# Patient Record
Sex: FEMALE | Race: WHITE | Employment: PART TIME | URBAN - METROPOLITAN AREA
[De-identification: names, ages, dates, MRNs, and addresses within clinical notes are randomized per-mention and may not be internally consistent; named-entity substitution may affect disease eponyms.]

---

## 2017-01-25 ENCOUNTER — HOSPITAL ENCOUNTER (OUTPATIENT)
Dept: LAB | Age: 57
Discharge: HOME | End: 2017-01-25
Payer: COMMERCIAL

## 2017-01-25 PROCEDURE — 88305 TISSUE EXAM BY PATHOLOGIST: CPT | Performed by: PLASTIC SURGERY

## 2017-01-31 ENCOUNTER — HOSPITAL ENCOUNTER (OUTPATIENT)
Dept: LAB | Age: 57
Discharge: HOME | End: 2017-01-31
Payer: COMMERCIAL

## 2017-01-31 DIAGNOSIS — I10 ESSENTIAL HYPERTENSION, MALIGNANT: ICD-10-CM

## 2017-01-31 LAB
ANION GAP BLD CALC-SCNC: 7 MMOL/L (ref 3–18)
BUN SERPL-MCNC: 16 MG/DL (ref 7–18)
BUN/CREAT SERPL: 18 (ref 12–20)
CALCIUM SERPL-MCNC: 9 MG/DL (ref 8.5–10.1)
CHLORIDE SERPL-SCNC: 102 MMOL/L (ref 100–108)
CO2 SERPL-SCNC: 29 MMOL/L (ref 21–32)
CREAT SERPL-MCNC: 0.91 MG/DL (ref 0.6–1.3)
GLUCOSE SERPL-MCNC: 320 MG/DL (ref 74–99)
POTASSIUM SERPL-SCNC: 4.5 MMOL/L (ref 3.5–5.5)
SODIUM SERPL-SCNC: 138 MMOL/L (ref 136–145)

## 2017-01-31 PROCEDURE — 93005 ELECTROCARDIOGRAM TRACING: CPT

## 2017-02-01 LAB
ATRIAL RATE: 73 BPM
CALCULATED P AXIS, ECG09: 39 DEGREES
CALCULATED R AXIS, ECG10: -31 DEGREES
CALCULATED T AXIS, ECG11: 3 DEGREES
DIAGNOSIS, 93000: NORMAL
P-R INTERVAL, ECG05: 190 MS
Q-T INTERVAL, ECG07: 414 MS
QRS DURATION, ECG06: 90 MS
QTC CALCULATION (BEZET), ECG08: 456 MS
VENTRICULAR RATE, ECG03: 73 BPM

## 2017-02-07 ENCOUNTER — HOSPITAL ENCOUNTER (OUTPATIENT)
Dept: LAB | Age: 57
Discharge: HOME | End: 2017-02-07
Payer: COMMERCIAL

## 2017-02-07 PROCEDURE — 88305 TISSUE EXAM BY PATHOLOGIST: CPT | Performed by: PLASTIC SURGERY

## 2017-02-07 PROCEDURE — 88331 PATH CONSLTJ SURG 1 BLK 1SPC: CPT | Performed by: PLASTIC SURGERY

## 2017-03-27 ENCOUNTER — OFFICE VISIT (OUTPATIENT)
Dept: SURGERY | Age: 57
End: 2017-03-27

## 2017-03-27 VITALS
SYSTOLIC BLOOD PRESSURE: 120 MMHG | BODY MASS INDEX: 40.12 KG/M2 | TEMPERATURE: 98.2 F | RESPIRATION RATE: 18 BRPM | DIASTOLIC BLOOD PRESSURE: 78 MMHG | HEIGHT: 64 IN | OXYGEN SATURATION: 96 % | WEIGHT: 235 LBS | HEART RATE: 72 BPM

## 2017-03-27 DIAGNOSIS — M79.621 AXILLARY PAIN, RIGHT: Primary | ICD-10-CM

## 2017-03-27 RX ORDER — SPIRONOLACTONE 25 MG/1
TABLET ORAL
Refills: 0 | COMMUNITY
Start: 2017-03-12

## 2017-03-27 RX ORDER — SULFAMETHOXAZOLE AND TRIMETHOPRIM 800; 160 MG/1; MG/1
TABLET ORAL
COMMUNITY
Start: 2017-03-23

## 2017-03-27 NOTE — LETTER
3/27/2017 2:35 PM 
 
Patient:  Amira Aquino YOB: 1960 Date of Visit: 3/27/2017 Miryam Sims MD 
[address] [address] VIA Facsimile: [phone] Dear Miryam Sims MD, Thank you for referring Ms. Sydney Zambrano to [place] [address] for evaluation and treatment. Below are the relevant portions of my assessment and plan of care. Thank you very much for your referral of Ms. Sydney Zambrano. If you have questions, please do not hesitate to call me. I look forward to following Ms. Heather Davidson along with you and will keep you updated as to her progress. Sincerely, Masoud Varela MD

## 2017-03-27 NOTE — PROGRESS NOTES
Patient is a 60 y.o. Female who presents for an evaluation of a recurrent left axillary abscess. She is currently taking Bactrim-DS which was prescribed by her PCP.

## 2017-03-27 NOTE — PATIENT INSTRUCTIONS
If you have any questions or concerns about today's appointment, the verbal and/or written instructions you were given for follow up care, please call our office at [phone].     [facility] - [address]    [phone] office  [phone]

## 2017-04-03 NOTE — PROGRESS NOTES
General Surgery Consult    Ronald Galan  Admit date: (Not on file)    MRN: [id number]     : 1960     Age: 60 y.o. Attending Physician: Estelle Cotto MD, [facility]      History of Present Illness:      Ronald Galan is a 60 y.o. female who presented with an infected lesion in her right arm. The lesion is actually in the axillary area. She said she took a course of antibiotics for it and now it is almost gone. No fever or chills.  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     Patient Active Problem List    Diagnosis Date Noted    Degeneration of cervical disc without myelopathy 2015    Bilateral low back pain without sciatica 2015    Neck pain     Low back pain      Past Medical History:   Diagnosis Date    Arthritis     Diabetes ([address]     GERD (gastroesophageal reflux disease)     Hypertension     Low back pain     Migraines     BLANCHARD (nonalcoholic steatohepatitis)     Neck pain     Neuropathy     Sleep apnea     Thyroid disease       Past Surgical History:   Procedure Laterality Date    HX BLADDER SUSPENSION      HX  SECTION      HX OTHER SURGICAL      Ovary removal    HX SALPINGO-OOPHORECTOMY        Social History   Substance Use Topics    Smoking status: Former Smoker    Smokeless tobacco: Never Used    Alcohol use No      History   Smoking Status    Former Smoker   Smokeless Tobacco    Never Used     Family History   Problem Relation Age of Onset    Breast Cancer Mother 79    Breast Cancer Other 27    Heart Disease Father     Breast Cancer Sister 47    Breast Cancer Maternal Grandmother 79    Hypertension Other     Heart Disease Other     Diabetes Other     Stroke Other     Osteoporosis Other     Heart Disease Brother       Current Outpatient Prescriptions   Medication Sig    spironolactone (ALDACTONE) 25 mg tablet     trimethoprim-sulfamethoxazole (BACTRIM DS, SEPTRA DS) 160-800 mg per tablet     HUMULIN R U-500, CONC, KWIKPEN 500 unit/mL (3 mL) inpn     EPIPEN 2-RUBY 0.3 mg/0.3 mL (1:1,000) injection 0.3 mg by IntraMUSCular route once as needed.  ergocalciferol (ERGOCALCIFEROL) 50,000 unit capsule Take 50,000 Units by mouth every seven (7) days.  losartan (COZAAR) 100 mg tablet     lidocaine (LIDODERM) 5 %(700 mg/patch) 3 Patches by TransDERmal route every twenty-four (24) hours. Apply patch to the affected area for 12 hours a day, remove for 12 hours a day.  metoprolol (LOPRESSOR) 25 mg tablet Take 25 mg by mouth two (2) times a day.  omeprazole (PRILOSEC) 20 mg capsule Take 20 mg by mouth daily.  pravastatin (PRAVACHOL) 20 mg tablet Take 20 mg by mouth nightly.  levothyroxine (SYNTHROID) 88 mcg tablet Take 88 mcg by mouth Daily (before breakfast).  rifaximin (XIFAXAN) 550 mg tablet Take 550 mg by mouth two (2) times a day.  losartan (COZAAR) 50 mg tablet Take 100 mg by mouth daily.  insulin lispro (HUMALOG) 100 unit/mL injection by SubCUTAneous route. No current facility-administered medications for this visit. Allergies   Allergen Reactions    Percocet [Oxycodone-Acetaminophen] Other (comments)     Tachycardia, heart palpitations          Review of Systems:  Pertinent items are noted in the History of Present Illness. Objective:     Visit Vitals    /78 (BP 1 Location: Left arm, BP Patient Position: Sitting)    Pulse 72    Temp 98.2 °F (36.8 °C) (Oral)    Resp 18    Ht 5' 4\" (1.626 m)    Wt 106.6 kg (235 lb)    SpO2 96%    BMI 40.34 kg/m2       Physical Exam:      General:  in no apparent distress                       Right axilla: A small area around 1 cm that is slightly hard, but no erythema or induration. No fluctuation. The area is non-tender on palpation.             Imaging and Lab Review:     CBC:   Lab Results   Component Value Date/Time    WBC 3.2 10/23/2015 07:45 AM    RBC 4.89 10/23/2015 07:45 AM    HGB 13.6 10/23/2015 07:45 AM    HCT 40.3 10/23/2015 07:45 AM    PLATELET 92 79/71/0284 07:45 AM     BMP:   Lab Results   Component Value Date/Time    Glucose 320 01/31/2017 04:16 PM    Sodium 138 01/31/2017 04:16 PM    Potassium 4.5 01/31/2017 04:16 PM    Chloride 102 01/31/2017 04:16 PM    CO2 29 01/31/2017 04:16 PM    BUN 16 01/31/2017 04:16 PM    Creatinine 0.91 01/31/2017 04:16 PM    Calcium 9.0 01/31/2017 04:16 PM     CMP:  Lab Results   Component Value Date/Time    Glucose 320 01/31/2017 04:16 PM    Sodium 138 01/31/2017 04:16 PM    Potassium 4.5 01/31/2017 04:16 PM    Chloride 102 01/31/2017 04:16 PM    CO2 29 01/31/2017 04:16 PM    BUN 16 01/31/2017 04:16 PM    Creatinine 0.91 01/31/2017 04:16 PM    Calcium 9.0 01/31/2017 04:16 PM    Anion gap 7 01/31/2017 04:16 PM    BUN/Creatinine ratio 18 01/31/2017 04:16 PM       No results found for this or any previous visit (from the past 24 hour(s)). images and reports reviewed    Assessment:   Ilia Suárez is a 60 y.o. female is presenting with a healing right axillary infection. Not sure if it is a hydradenitis or a sebaceous cyst. But currently the area  is not infected.     Plan:     No need for any surgical intervention  Followup in 2-4 weeks    Please call me if you have any questions (cell phone: [phone])     Signed By: Atilio Terrazas MD     April 3, 2017

## 2017-11-07 ENCOUNTER — OFFICE VISIT (OUTPATIENT)
Dept: ORTHOPEDIC SURGERY | Age: 57
End: 2017-11-07

## 2017-11-07 VITALS
DIASTOLIC BLOOD PRESSURE: 73 MMHG | OXYGEN SATURATION: 97 % | WEIGHT: 235 LBS | HEART RATE: 62 BPM | HEIGHT: 64 IN | SYSTOLIC BLOOD PRESSURE: 130 MMHG | BODY MASS INDEX: 40.12 KG/M2 | TEMPERATURE: 97.3 F

## 2017-11-07 DIAGNOSIS — M76.62 ACHILLES TENDINITIS OF LEFT LOWER EXTREMITY: Primary | ICD-10-CM

## 2017-11-07 NOTE — MR AVS SNAPSHOT
Visit Information Date & Time Provider Department Dept. Phone Encounter #  
 11/7/2017  1:10 PM Jojo Brito MD [facility] [phone] [id number] Upcoming Health Maintenance Date Due Hepatitis C Screening 1960 DTaP/Tdap/Td series (1 - Tdap) 9/25/1979 PAP AKA CERVICAL CYTOLOGY 9/25/1979 FOBT Q 1 YEAR AGE 50-75 9/25/2008 Influenza Age 5 to Adult 8/1/2017 BREAST CANCER SCRN MAMMOGRAM 10/20/2018 Allergies as of 11/7/2017  Review Complete On: 11/7/2017 By: Vic Jones Severity Noted Reaction Type Reactions Percocet [Oxycodone-acetaminophen]  08/15/2013    Other (comments) Tachycardia, heart palpitations Current Immunizations  Never Reviewed No immunizations on file. Not reviewed this visit You Were Diagnosed With   
  
 Codes Comments Achilles tendinitis of left lower extremity    -  Primary ICD-10-CM: M76.62 
ICD-9-CM: 726.71 Vitals BP Pulse Temp Height(growth percentile) Weight(growth percentile) SpO2  
 130/73 62 97.3 °F (36.3 °C) (Oral) 5' 4\" (1.626 m) 235 lb (106.6 kg) 97% BMI OB Status Smoking Status 40.34 kg/m2 Postmenopausal Former Smoker BMI and BSA Data Body Mass Index Body Surface Area  
 40.34 kg/m 2 2.19 m 2 Preferred Pharmacy Pharmacy Name Phone [address] [phone] Your Updated Medication List  
  
   
This list is accurate as of: 11/7/17  2:36 PM.  Always use your most recent med list.  
  
  
  
  
 * COZAAR 50 mg tablet Generic drug:  losartan Take 100 mg by mouth daily. * losartan 100 mg tablet Commonly known as:  COZAAR  
  
 EPIPEN 2-RUBY 0.3 mg/0.3 mL injection Generic drug:  EPINEPHrine  
0.3 mg by IntraMUSCular route once as needed. ergocalciferol 50,000 unit capsule Commonly known as:  ERGOCALCIFEROL Take 50,000 Units by mouth every seven (7) days. HumuLIN R U-500 (Conc) Kwikpen 500 unit/mL (3 mL) Inpn subQ pen Generic drug:  insulin U-500 CONCENTRATED regular  
  
 insulin lispro 100 unit/mL injection Commonly known as:  HUMALOG  
by SubCUTAneous route.  
  
 lidocaine 5 % Commonly known as:  LIDODERM  
3 Patches by TransDERmal route every twenty-four (24) hours. Apply patch to the affected area for 12 hours a day, remove for 12 hours a day. metoprolol tartrate 25 mg tablet Commonly known as:  LOPRESSOR Take 25 mg by mouth two (2) times a day. PRAVACHOL 20 mg tablet Generic drug:  pravastatin Take 20 mg by mouth nightly. PriLOSEC 20 mg capsule Generic drug:  omeprazole Take 20 mg by mouth daily. spironolactone 25 mg tablet Commonly known as:  ALDACTONE  
  
 SYNTHROID 88 mcg tablet Generic drug:  levothyroxine Take 88 mcg by mouth Daily (before breakfast). trimethoprim-sulfamethoxazole 160-800 mg per tablet Commonly known as:  BACTRIM DS, SEPTRA DS  
  
 XIFAXAN 550 mg tablet Generic drug:  rifAXIMin Take 550 mg by mouth two (2) times a day. * Notice: This list has 2 medication(s) that are the same as other medications prescribed for you. Read the directions carefully, and ask your doctor or other care provider to review them with you. We Performed the Following AMB POC XRAY, FOOT; COMPLETE, 3+ VIEW [01317 CPT(R)] AMB SUPPLY ORDER [[id number] Custom] Comments:  
 Left short CAM walker boot (size 7) POC XRAY, ANKLE; 2 VIEWS [60727 CPT(R)] Patient Instructions Please follow up in 4 weeks. You are advised to contact us if your condition worsens. Bursitis: Care Instructions Your Care Instructions A bursa is a small sac of fluid that helps the tissues around a joint slide over one another easily.  Injury or overuse of a joint can cause pain, redness, and inflammation in the bursa (bursitis). Bursitis usually gets better if you avoid the activity that caused it. You can help prevent bursitis from coming back by doing stretching and strengthening exercises. You may also need to change the way you do some activities. Follow-up care is a key part of your treatment and safety. Be sure to make and go to all appointments, and call your doctor if you are having problems. It's also a good idea to know your test results and keep a list of the medicines you take. How can you care for yourself at home? · Put ice or a cold pack on the area for 10 to 20 minutes at a time. Try to do this every 1 to 2 hours for the next 3 days (when you are awake) or until the swelling goes down. Put a thin cloth between the ice and your skin. · After the 3 days of using ice, you may use heat on the area. You can use a hot water bottle; a warm, moist towel; or a heating pad set on low. You can also try alternating heat and ice. · Rest the area where you have pain. Stop any activities that cause pain. Switch to activities that do not stress the area. · Take pain medicines exactly as directed. ¨ If the doctor gave you a prescription medicine for pain, take it as prescribed. ¨ If you are not taking a prescription pain medicine, ask your doctor if you can take an over-the-counter medicine. ¨ Do not take two or more pain medicines at the same time unless the doctor told you to. Many pain medicines have acetaminophen, which is Tylenol. Too much acetaminophen (Tylenol) can be harmful. · To prevent stiffness, gently move the joint as much as you can without pain every day. As the pain gets better, keep doing range-of-motion exercises. Ask your doctor for exercises that will make the muscles around the joint stronger. Do these as directed.  
· You can slowly return to the activity that caused the pain, but do it with less effort until you can do it without pain or swelling. Be sure to warm up before and stretch after you do the activity. When should you call for help? Call your doctor now or seek immediate medical care if: 
? · You have new or worse symptoms of infection, such as: 
¨ Increased pain, swelling, warmth, or redness. ¨ Red streaks leading from the area. ¨ Pus draining from the area. ¨ A fever. ? Watch closely for changes in your health, and be sure to contact your doctor if: 
? · You do not get better as expected. Where can you learn more? Go to http://hola-pacheco.info/. Enter T009 in the search box to learn more about \"Bursitis: Care Instructions. \" Current as of: March 21, 2017 Content Version: 11.4 © 1672-3994 [employer]. Care instructions adapted under license by [employer] (which disclaims liability or warranty for this information). If you have questions about a medical condition or this instruction, always ask your healthcare professional. [place] [address] any warranty or liability for your use of this information. Introducing [facility]! [facility] introduces Publish2 patient portal. Now you can access parts of your medical record, email your doctor's office, and request medication refills online. 1. In your internet browser, go to [email or web address]. "[employer] 2. Click on the First Time User? Click Here link in the Sign In box. You will see the New Member Sign Up page. 3. Enter your Publish2 Access Code exactly as it appears below. You will not need to use this code after youve completed the sign-up process. If you do not sign up before the expiration date, you must request a new code. · Publish2 Access Code: [email or web address] Expires: 2/5/2018  2:35 PM 
 
4. Enter the last four digits of your Social Security Number (xxxx) and Date of Birth (mm/dd/yyyy) as indicated and click Submit.  You will be taken to the next sign-up page. 5. Create a VeliQ ID. This will be your VeliQ login ID and cannot be changed, so think of one that is secure and easy to remember. 6. Create a VeliQ password. You can change your password at any time. 7. Enter your Password Reset Question and Answer. This can be used at a later time if you forget your password. 8. Enter your e-mail address. You will receive e-mail notification when new information is available in [address]. 9. Click Sign Up. You can now view and download portions of your medical record. 10. Click the Download Summary menu link to download a portable copy of your medical information. If you have questions, please visit the Frequently Asked Questions section of the VeliQ website. Remember, VeliQ is NOT to be used for urgent needs. For medical emergencies, dial 911. Now available from your iPhone and Android! Please provide this summary of care documentation to your next provider. Your primary care clinician is listed as Brandon Adame. If you have any questions after today's visit, please call [phone].

## 2017-11-07 NOTE — PROGRESS NOTES
AMBULATORY PROGRESS NOTE      Patient: Hima Miradna             MRN: [id number]     SSN: [id number] Body mass index is 40.34 kg/(m^2). YOB: 1960     AGE: 59 y.o. EX: female    PCP: Andrew Real MD (Inactive)    IMPRESSION/DIAGNOSIS AND TREATMENT PLAN     DIAGNOSES  1. Achilles tendinitis of left lower extremity        Orders Placed This Encounter    AMB SUPPLY ORDER    [15642] Ankle 2V    [98575] Foot Min 3V      Hima Miranda understands her diagnoses and the proposed plan. Plan:    1) Exercise: Walk shorter distances, use a stationary bike to strengthen gastrocnemius and soleus muscles. 2) Continue Physical Therapy as directed. 3) DME Order: Left Short CAM walker boot. RTO - 4 weeks    HPI AND EXAMINATION     Hima Miranda IS A 59 y.o. female who presents to my outpatient office complaining of left foot pain. The patient reports that she has been experiencing pain along the left Achilles' tendon. Patient states that she has seen Dr. Melody Murcia who has recommended PT for the patient. Ms. Tianna Russ notes that she experiences start-up pain that subsides after ambulation. The patient reports that she is planning to have a Sleeve Gastrectomy the summer of 2018. Hima Miranda is alert/oriented (name, location, time) and follows commands well. she  is in no acute distress and her affect and mood are appropriate. Left ACHILLES GASTROCNEMIUS COMPLEX,PLANTAR FASCIA    Gait: slow  Tenderness: severe Achilles tendon sheath Insertional point    NO Noninsertional Achilles tendon tenderness   Calf tenderness: Absent for calf or gastrocnemius muscle regions   Soft, supple, non tender, non taut lower extremity compartments   NONE to Medial Malleolar, 4/5 Met base midfoot, achilles, tib post, or   NONE to syndesmosis.          no to plantar fascia, or central calcaneal region  Deformity/Swelling:  NO  Insertional point of Distal Achilles Tendon:    NO Fusiform noninsertional focal tendinopathy   NO Dhruv's Deformity Present  Cutaneous: No rashes, skin patches, wounds, or abrasions to the lower legs           Warm and Normal color. No regions of expressible drainage. Medial Border of Tibia Region: absent           Skin color, texture, turgor normal. Normal.  Joint Motion: ROM Ankle:Normal , Hindfoot: (ST,TN,CC Normal}, Forefoot toes:Normal  Neurologic Exam: Neuro: Motor: normal 5/5 strength in all tested muscle groups and Sensory : no sensory deficits noted. No abnormal hand/wrist, foot/ankle, or facial/neck tremors. Contractures: Gastrocnemius or Achilles Contractures absent. Joint / Tendon Stability:    No anterolateral or varus instability of the Ankle or Subtalar Joints              No peroneal tendon instability present with ankle circumduction  Alignment:  Normal Foot Alignment and Semi Rigid  Vascular: Normal Pulses/ NL Capillary refill, No evidence of DVT seen on physical exam.   No calf swelling, no tenderness to calf. Varicosities Lower Limbs :none  Lymphatic:  No Evidence of Lymphedema    CHART REVIEW     Past Medical History:   Diagnosis Date    Arthritis     Diabetes [address] [address]     GERD (gastroesophageal reflux disease)     Hypertension     Low back pain     Migraines     BLANCHARD (nonalcoholic steatohepatitis)     Neck pain     Neuropathy     Sleep apnea     Thyroid disease      Current Outpatient Prescriptions   Medication Sig    spironolactone (ALDACTONE) 25 mg tablet     HUMULIN R U-500, CONC, KWIKPEN 500 unit/mL (3 mL) inpn     EPIPEN 2-RUBY 0.3 mg/0.3 mL (1:1,000) injection 0.3 mg by IntraMUSCular route once as needed.  ergocalciferol (ERGOCALCIFEROL) 50,000 unit capsule Take 50,000 Units by mouth every seven (7) days.  metoprolol (LOPRESSOR) 25 mg tablet Take 25 mg by mouth two (2) times a day.  losartan (COZAAR) 50 mg tablet Take 100 mg by mouth daily.  omeprazole (PRILOSEC) 20 mg capsule Take 20 mg by mouth daily.     pravastatin (PRAVACHOL) 20 mg tablet Take 20 mg by mouth nightly.  levothyroxine (SYNTHROID) 88 mcg tablet Take 88 mcg by mouth Daily (before breakfast).  trimethoprim-sulfamethoxazole (BACTRIM DS, SEPTRA DS) 160-800 mg per tablet     losartan (COZAAR) 100 mg tablet     lidocaine (LIDODERM) 5 %(700 mg/patch) 3 Patches by TransDERmal route every twenty-four (24) hours. Apply patch to the affected area for 12 hours a day, remove for 12 hours a day.  rifaximin (XIFAXAN) 550 mg tablet Take 550 mg by mouth two (2) times a day.  insulin lispro (HUMALOG) 100 unit/mL injection by SubCUTAneous route. No current facility-administered medications for this visit. Allergies   Allergen Reactions    Percocet [Oxycodone-Acetaminophen] Other (comments)     Tachycardia, heart palpitations     Past Surgical History:   Procedure Laterality Date    HX BLADDER SUSPENSION      HX  SECTION      HX OTHER SURGICAL      Ovary removal    HX SALPINGO-OOPHORECTOMY       Social History     Occupational History    Not on file. Social History Main Topics    Smoking status: Former Smoker    Smokeless tobacco: Never Used    Alcohol use No    Drug use: No    Sexual activity: Not on file     Family History   Problem Relation Age of Onset    Breast Cancer Mother 79    Breast Cancer Other 27    Heart Disease Father     Breast Cancer Sister 47    Breast Cancer Maternal Grandmother 79    Hypertension Other     Heart Disease Other     Diabetes Other     Stroke Other     Osteoporosis Other     Heart Disease Brother        REVIEW OF SYSTEMS : 2017  ALL BELOW ARE Negative except : SEE HPI       Constitutional: Negative for fever, chills and weight loss. Neg Weigh Loss  Cardiovascular: Negative for chest pain, claudication and leg swelling.  SOB, BALLESTEROS   Gastrointestinal: Negative for  pain, N/V/D/C, Blood in stool or urine,dysuria, hematuria,        Incontinence, pelvic pain  Musculoskeletal: see HPI. Neurological: Negative for dizziness and weakness. Negative for headaches,Visual Changes, Confusion, Seizures,   Psychiatric/Behavioral: Negative for depression, memory loss and substance abuse. Extremities:  Negative for  hair changes, rash or skin lesion changes. Hematologic: Negative for Bleeding problems, bruising, pallor or swollen lymph nodes. Peripheral Vascular: No calf pain, vascular vein tenderness to calf pain              No calf throbbing, posterior knee throbbing pain    DIAGNOSTIC IMAGING      Dictation on: 11/07/2017  2:06 PM by: Kacy An [[id number]]         Written by Beatris Mora, as dictated by Lacie Sharma MD. I, , Lacie Sharma MD, confirm that all documentation is accurate.

## 2017-11-07 NOTE — PROCEDURES
DIAGNOSTIC STUDIES:  X-rays of the left foot, three views, AP, lateral, and oblique: There is more widening of the left forefoot compared to the midfoot. There is no fracture, subluxation, or dislocation at the MTP joints one through five. In these nonweightbearing films, the talonavicular and calcaneal cuboid joints are well-maintained. Three views of the ankle, AP, lateral, and oblique, reveal a large calcific bone spur at the insertion point of the Achilles tendon and a moderate bone spur at the inferior surface of the calcaneus with some degenerative changes seen to the dorsal part of the talonavicular joint in the lateral radiographic x-ray. There is a small amount of spurring to the medial malleolus distal tip. Otherwise, the weightbearing portion of the tibio talar region is still fairly well maintained without any dislocation or subluxation.

## 2017-11-07 NOTE — PATIENT INSTRUCTIONS
Please follow up in 4 weeks. You are advised to contact us if your condition worsens. Bursitis: Care Instructions  Your Care Instructions    A bursa is a small sac of fluid that helps the tissues around a joint slide over one another easily. Injury or overuse of a joint can cause pain, redness, and inflammation in the bursa (bursitis). Bursitis usually gets better if you avoid the activity that caused it. You can help prevent bursitis from coming back by doing stretching and strengthening exercises. You may also need to change the way you do some activities. Follow-up care is a key part of your treatment and safety. Be sure to make and go to all appointments, and call your doctor if you are having problems. It's also a good idea to know your test results and keep a list of the medicines you take. How can you care for yourself at home? · Put ice or a cold pack on the area for 10 to 20 minutes at a time. Try to do this every 1 to 2 hours for the next 3 days (when you are awake) or until the swelling goes down. Put a thin cloth between the ice and your skin. · After the 3 days of using ice, you may use heat on the area. You can use a hot water bottle; a warm, moist towel; or a heating pad set on low. You can also try alternating heat and ice. · Rest the area where you have pain. Stop any activities that cause pain. Switch to activities that do not stress the area. · Take pain medicines exactly as directed. ¨ If the doctor gave you a prescription medicine for pain, take it as prescribed. ¨ If you are not taking a prescription pain medicine, ask your doctor if you can take an over-the-counter medicine. ¨ Do not take two or more pain medicines at the same time unless the doctor told you to. Many pain medicines have acetaminophen, which is Tylenol. Too much acetaminophen (Tylenol) can be harmful. · To prevent stiffness, gently move the joint as much as you can without pain every day.  As the pain gets better, keep doing range-of-motion exercises. Ask your doctor for exercises that will make the muscles around the joint stronger. Do these as directed. · You can slowly return to the activity that caused the pain, but do it with less effort until you can do it without pain or swelling. Be sure to warm up before and stretch after you do the activity. When should you call for help? Call your doctor now or seek immediate medical care if:  ? · You have new or worse symptoms of infection, such as:  ¨ Increased pain, swelling, warmth, or redness. ¨ Red streaks leading from the area. ¨ Pus draining from the area. ¨ A fever. ? Watch closely for changes in your health, and be sure to contact your doctor if:  ? · You do not get better as expected. Where can you learn more? Go to http://hola-pacheco.info/. Enter B837 in the search box to learn more about \"Bursitis: Care Instructions. \"  Current as of: March 21, 2017  Content Version: 11.4  © 8972-2556 [employer]. Care instructions adapted under license by [employer] (which disclaims liability or warranty for this information). If you have questions about a medical condition or this instruction, always ask your healthcare professional. [place] [address] any warranty or liability for your use of this information.